# Patient Record
Sex: MALE | Race: WHITE | URBAN - METROPOLITAN AREA
[De-identification: names, ages, dates, MRNs, and addresses within clinical notes are randomized per-mention and may not be internally consistent; named-entity substitution may affect disease eponyms.]

---

## 2018-08-20 ENCOUNTER — VIRTUAL VISIT (OUTPATIENT)
Dept: FAMILY MEDICINE | Facility: OTHER | Age: 43
End: 2018-08-20

## 2018-08-21 NOTE — PROGRESS NOTES
"Date:   Clinician: Rudolph Hudson  Clinician NPI: [id number]  Patient: David Corbin  Patient : 1975  Patient Address: [address] [place] [address]  Patient Phone: [phone]  Visit Protocol: URI  Patient Summary:  David is a 43 year old ( : 1975 ) male who initiated a Visit for cold, sinus infection, or influenza. When asked the question \"Please sign me up to receive news, health information and promotions. \", David responded \"No\".    David states his symptoms started gradually 2-3 weeks ago. After his symptoms started, they improved and then got worse again.   His symptoms consist of myalgia, enlarged lymph nodes, malaise, and a sore throat.   Symptom details   Sore throat: David reports having mild throat pain (between 1-3 on a 10 point pain scale), has exudate on his tonsils, and can swallow liquids. The lymph nodes in his neck are enlarged. A rash has not appeared on the skin since the sore throat started.    David denies having wheezing, facial pain or pressure, chills, cough, rhinitis, nasal congestion, fever, dyspnea, ear pain, headache, and teeth pain. He also denies having recent facial or sinus surgery in the past 60 days.   Within the past week, David has been exposed to someone with strep throat. He has not recently been exposed to someone with influenza. David has been in close contact with the following high risk individuals: children under the age of 5.   David has taken an antibiotic medication in the past month. Antibiotic details as reported by the patient (free text): Amoxicillian - Had a 6-7 500 mg pills left over from prior bout with strep throat two years ago.    I took 2-3 pills and symptoms went away for 4-5 days.    Sore throat came back and has been around for 1.5 weeks, along with general malaise and swollen lymph.   Weight: 180 lbs   David does not smoke or use smokeless tobacco.   Additional information as reported by the patient (free text): " Symptoms started on 8/1 with severe pain in throat on 8/2 ( 8 out of 10 on pain scale).   I took 2 Amoxicillian pills on 8/2 and symptoms were significantly reduced until 8/8 when pain started to reappear in throat.    I took remaining Amoxicicilian pills 8/9-8/11 (500 mg pills  1x a day).     Symptoms were alleviated until 8/13 when sore throat reappeared.    Have had a sore throat on pain scale    3 for last week along with painfull neck and general tiredness.   MEDICATIONS: No current medications, ALLERGIES: NKDA  Clinician Response:  Dear David,  Based on the information provided, you have acute bacterial sinusitis, also known as a sinus infection. Sinus infections are caused by bacteria or a virus and symptoms are almost always identical. The difference between the 2 types of infections is timing.  Sinus infections start as viral infections and symptoms improve on their own in about 7 days. If symptoms have not improved after 7 days or have even worsened, a bacterial infection may have developed.  Medication information  I am prescribing:     Amoxicillin-pot clavulanate 875-125 mg oral tablet. Take 1 tablet by mouth every 12 hours for 10 days. There are no refills with this prescription.   Antibiotics can cause an allergic reaction even if you have taken them without a problem in the past. If you develop a new rash, swelling, or difficulty breathing, stop the medication and be seen in a clinic or urgent care immediately.  Self care  The following tips will keep you as comfortable as possible while you recover:     Rest    Drink plenty of water and other liquids    Take a hot shower to loosen congestion    Use throat lozenges    Gargle with warm salt water (1/4 teaspoon of salt per 8 ounce glass of water)    Suck on frozen items such as popsicles or ice cubes    Drink hot tea with lemon and honey     When to seek care  Please be seen in a clinic or urgent care if any of the following occur:     Symptoms do not  start to improve after 3 days of treatment    New symptoms develop, or symptoms become worse     Call 911 or go to the emergency room if you feel that your throat is closing off, you suddenly develop a rash, you are unable to swallow fluids, you are drooling, or you are having difficulty breathing.   Diagnosis: Acute bacterial sinusitis  Diagnosis ICD: J01.90  Prescription: amoxicillin-pot clavulanate 875-125 mg oral tablet 20 tablet, 10 days supply. Take 1 tablet by mouth every 12 hours for 10 days. Refills: 0, Refill as needed: no, Allow substitutions: yes  Pharmacy: [id number] - [phone] - [id number], [place] [address]